# Patient Record
Sex: MALE | Race: WHITE | NOT HISPANIC OR LATINO | Employment: OTHER | URBAN - METROPOLITAN AREA
[De-identification: names, ages, dates, MRNs, and addresses within clinical notes are randomized per-mention and may not be internally consistent; named-entity substitution may affect disease eponyms.]

---

## 2017-08-07 PROBLEM — F17.200 SMOKING: Status: ACTIVE | Noted: 2017-08-07

## 2019-02-15 PROBLEM — R94.39 ABNORMAL STRESS TEST: Status: ACTIVE | Noted: 2019-02-15

## 2020-03-19 PROBLEM — I48.0 PAROXYSMAL ATRIAL FIBRILLATION: Status: ACTIVE | Noted: 2020-03-19

## 2021-04-29 ENCOUNTER — PATIENT MESSAGE (OUTPATIENT)
Dept: RESEARCH | Facility: HOSPITAL | Age: 61
End: 2021-04-29

## 2025-02-12 ENCOUNTER — TELEPHONE (OUTPATIENT)
Facility: CLINIC | Age: 65
End: 2025-02-12
Payer: MEDICARE

## 2025-02-12 DIAGNOSIS — C16.0 ADENOCARCINOMA OF CARDIO-ESOPHAGEAL JUNCTION: Primary | ICD-10-CM

## 2025-02-12 NOTE — NURSING
Reached out to patient in regards to message received from Dr. Mclean. Scheduled patient for PET scan. Patient and wife understand plan of care to have EUS procedure and awaiting pathology reports. They are unsure if they would like to be seen in [place]. He is establsihed at [facility] with Dr. Torres for iron transfusions. Contact information given. Will continue to monitor for results and follow up with patient accordingly.

## 2025-02-18 ENCOUNTER — HOSPITAL ENCOUNTER (OUTPATIENT)
Facility: HOSPITAL | Age: 65
Discharge: HOME | End: 2025-02-18
Attending: INTERNAL MEDICINE | Admitting: INTERNAL MEDICINE
Payer: MEDICARE

## 2025-02-18 ENCOUNTER — ANESTHESIA (OUTPATIENT)
Dept: SURGERY | Facility: HOSPITAL | Age: 65
End: 2025-02-18
Payer: MEDICARE

## 2025-02-18 ENCOUNTER — ANESTHESIA EVENT (OUTPATIENT)
Dept: SURGERY | Facility: HOSPITAL | Age: 65
End: 2025-02-18
Payer: MEDICARE

## 2025-02-18 VITALS
HEART RATE: 80 BPM | OXYGEN SATURATION: 98 % | RESPIRATION RATE: 3 BRPM | HEART RATE: 76 BPM | OXYGEN SATURATION: 97 % | SYSTOLIC BLOOD PRESSURE: 111 MMHG | DIASTOLIC BLOOD PRESSURE: 66 MMHG | DIASTOLIC BLOOD PRESSURE: 54 MMHG | SYSTOLIC BLOOD PRESSURE: 111 MMHG | RESPIRATION RATE: 16 BRPM | TEMPERATURE: 98 F

## 2025-02-18 DIAGNOSIS — C16.0 ADENOCARCINOMA OF CARDIO-ESOPHAGEAL JUNCTION: ICD-10-CM

## 2025-02-18 PROCEDURE — 37000009 HC ANESTHESIA EA ADD 15 MINS: Performed by: INTERNAL MEDICINE

## 2025-02-18 PROCEDURE — 37000008 HC ANESTHESIA 1ST 15 MINUTES: Performed by: INTERNAL MEDICINE

## 2025-02-18 PROCEDURE — 43259 EGD US EXAM DUODENUM/JEJUNUM: CPT | Performed by: INTERNAL MEDICINE

## 2025-02-18 PROCEDURE — 25000003 PHARM REV CODE 250: Performed by: NURSE ANESTHETIST, CERTIFIED REGISTERED

## 2025-02-18 PROCEDURE — 63600175 PHARM REV CODE 636 W HCPCS: Performed by: NURSE ANESTHETIST, CERTIFIED REGISTERED

## 2025-02-18 RX ORDER — PROPOFOL 10 MG/ML
VIAL (ML) INTRAVENOUS
Status: DISCONTINUED | OUTPATIENT
Start: 2025-02-18 | End: 2025-02-18

## 2025-02-18 RX ADMIN — PROPOFOL 100 MG: 10 INJECTION, EMULSION INTRAVENOUS at 12:02

## 2025-02-18 RX ADMIN — SODIUM CHLORIDE: 9 INJECTION, SOLUTION INTRAVENOUS at 12:02

## 2025-02-18 NOTE — TRANSFER OF CARE
Anesthesia Transfer of Care Note    Patient: Erick Mccloud II    Procedure(s) Performed: Procedure(s) (LRB):  ULTRASOUND, UPPER GI TRACT, ENDOSCOPIC (N/A)    Patient location: GI    Anesthesia Type: general    Transport from OR: Transported from OR on room air with adequate spontaneous ventilation    Post pain: adequate analgesia    Post assessment: no apparent anesthetic complications and tolerated procedure well    Post vital signs: stable    Level of consciousness: sedated and responds to stimulation    Nausea/Vomiting: no nausea/vomiting    Complications: none    Transfer of care protocol was followed      Last vitals: Visit Vitals  /80 (BP Location: Left arm, Patient Position: Lying)   Pulse 62   Temp 36.8 °C (98.2 °F) (Temporal)   Resp 17   SpO2 97%

## 2025-02-18 NOTE — H&P
GASTROENTEROLOGY PRE-PROCEDURE H&P NOTE  Patient Name: Erick Mccloud II  Patient MRN: [id number]  Patient : 1960    Service date: 2025    PCP: Eliana Burk MD    No chief complaint on file.      HPI: Patient is a 64 y.o. male with PMHx as below here for evaluation of staging of esophageal cancer. .     Past Medical History:  Past Medical History:   Diagnosis Date    Anticoagulant long-term use     Diabetes mellitus     Hypertension     Mixed hyperlipidemia     Sleep apnea     no CPAP        Past Surgical History:  Past Surgical History:   Procedure Laterality Date    ADENOIDECTOMY      ANGIOGRAM, CORONARY, WITH LEFT HEART CATHETERIZATION  1/15/2025    Procedure: Left Heart Cath;  Surgeon: Rajat Perera MD;  Location: [facility] CATH;  Service: Cardiology;;    ATHERECTOMY  2020    Procedure: Atherectomy;  Surgeon: Katarzyna Mccarty MD;  Location: [facility] CATH;  Service: Cardiology;;    BACK SURGERY      CARDIAC SURGERY      stents x 1 2017    CORONARY ANGIOGRAPHY Left 02/15/2019    Procedure: ANGIOGRAM, CORONARY ARTERY;  Surgeon: Katarzyna Mccarty MD;  Location: [facility] CATH;  Service: Cardiology;  Laterality: Left;    CORONARY ANGIOGRAPHY Left 2020    Procedure: ANGIOGRAM, CORONARY ARTERY;  Surgeon: Katarzyna Mccarty MD;  Location: [facility];  Service: Cardiology;  Laterality: Left;    CORONARY STENT PLACEMENT N/A 02/15/2019    Procedure: INSERTION, STENT, CORONARY ARTERY;  Surgeon: Katarzyna Mccarty MD;  Location: [facility];  Service: Cardiology;  Laterality: N/A;    LEFT HEART CATHETERIZATION Left 2020    Procedure: Left heart cath;  Surgeon: Katarzyna Mccarty MD;  Location: [facility]ATH;  Service: Cardiology;  Laterality: Left;    LUMBAR DISC SURGERY      TONSILLECTOMY          Home Medications:  Prescriptions Prior to Admission[1]            Review of patient's allergies indicates:  No Known Allergies    Social History:   Social History     Occupational History    Not on file   Tobacco Use    Smoking status:  "Every Day     Current packs/day: 1.00     Average packs/day: 1 pack/day for 46.1 years (46.1 ttl pk-yrs)     Types: Pipe, Cigarettes     Start date: 1979    Smokeless tobacco: Never    Tobacco comments:     Prescribed Chantix do not smoke 24 hours prior to procedure   Substance and Sexual Activity    Alcohol use: No    Drug use: No    Sexual activity: Yes     Partners: Female       Family History:   Family History   Problem Relation Name Age of Onset    Heart disease Mother         Review of Systems:  A 10 point review of systems was performed and was normal, except as mentioned in the HPI, including constitutional, HEENT, heme, lymph, cardiovascular, respiratory, gastrointestinal, genitourinary, neurologic, endocrine, psychiatric and musculoskeletal.      OBJECTIVE:    Physical Exam:  24 Hour Vital Sign Ranges: Temp:  [98.2 °F (36.8 °C)] 98.2 °F (36.8 °C)  Pulse:  [62] 62  Resp:  [17] 17  SpO2:  [97 %] 97 %  BP: (132)/(80) 132/80  Most recent vitals: /80 (BP Location: Left arm, Patient Position: Lying)   Pulse 62   Temp 98.2 °F (36.8 °C) (Temporal)   Resp 17   SpO2 97%    GEN: well-developed, well-nourished, awake and alert, non-toxic appearing adult  HEENT: PERRL, sclera anicteric, oral mucosa pink and moist without lesion  NECK: trachea midline; Good ROM  CV: regular rate and rhythm, no murmurs or gallops  RESP: clear to auscultation bilaterally, no wheezes, rhonci or rales  ABD: soft, non-tender, non-distended, normal bowel sounds  EXT: no swelling or edema, 2+ pulses distally  SKIN: no rashes or jaundice  PSYCH: normal affect    Labs:   No results for input(s): "WBC", "MCV", "PLT" in the last 72 hours.    Invalid input(s): "HGBAU"  No results for input(s): "NA", "K", "CL", "CO2", "BUN", "GLU" in the last 72 hours.    Invalid input(s): "CREA"  No results for input(s): "ALB" in the last 72 hours.    Invalid input(s): "ALKP", "SGOT", "SGPT", "TBIL", "DBIL", "TPRO"  No results for input(s): "PT", "INR", " ""PTT" in the last 72 hours.      IMPRESSION / RECOMMENDATIONS:  EGD/EUS  with interventions as warranted.   RIsks, benefits, alternatives discussed in detail regarding upcoming procedures and sedation. Some of the more common endoscopic complications include but not limited to immediate or delayed perforation, bleeding, infections, pain, inadvertent injury to surrounding tissue / organs and possible need for surgical evaluation. Patient expressed understanding, all questions answered and will proceed with procedure as planned.     Chepe Andrews III  2/18/2025  12:03 PM           [1]   Medications Prior to Admission   Medication Sig Dispense Refill Last Dose/Taking    bromocriptine (CYCLOSET) 0.8 mg Tab Take 2 tablets by mouth nightly.       JARDIANCE 10 mg tablet Take 10 mg by mouth once daily.       metoprolol succinate (TOPROL XL) 25 MG 24 hr tablet Take 1 tablet (25 mg total) by mouth once daily. (Patient taking differently: Take 25 mg by mouth nightly.) 90 tablet 3     multivitamin with minerals tablet Take 1 tablet by mouth every morning.        olmesartan (BENICAR) 40 MG tablet Take 40 mg by mouth once daily.       rosuvastatin (CRESTOR) 20 MG tablet TAKE 1 TABLET EVERY EVENING (Patient taking differently: Take 20 mg by mouth every evening.) 90 tablet 3     semaglutide (OZEMPIC SUBQ) Inject into the skin.       testosterone (ANDROGEL) 20.25 mg/1.25 gram (1.62 %) GlPm every 14 (fourteen) days. 200units injection once every 2 weeks       vitamins  A,C,E-zinc-copper [id number] unit-mg-unit Cap Take 1 tablet by mouth 2 (two) times a day.        "

## 2025-02-18 NOTE — ANESTHESIA PREPROCEDURE EVALUATION
02/18/2025  Erick Mccloud II is a 64 y.o., male.      Problem List[1]    Past Surgical History:   Procedure Laterality Date    ADENOIDECTOMY      ANGIOGRAM, CORONARY, WITH LEFT HEART CATHETERIZATION  1/15/2025    Procedure: Left Heart Cath;  Surgeon: Rajat Perera MD;  Location: [facility];  Service: Cardiology;;    ATHERECTOMY  02/07/2020    Procedure: Atherectomy;  Surgeon: Katarzyna Mccarty MD;  Location: [facility];  Service: Cardiology;;    BACK SURGERY      CARDIAC SURGERY      stents x 1 2017    CORONARY ANGIOGRAPHY Left 02/15/2019    Procedure: ANGIOGRAM, CORONARY ARTERY;  Surgeon: Katarzyna Mccarty MD;  Location: [facility];  Service: Cardiology;  Laterality: Left;    CORONARY ANGIOGRAPHY Left 02/07/2020    Procedure: ANGIOGRAM, CORONARY ARTERY;  Surgeon: Katarzyna Mccarty MD;  Location: [facility];  Service: Cardiology;  Laterality: Left;    CORONARY STENT PLACEMENT N/A 02/15/2019    Procedure: INSERTION, STENT, CORONARY ARTERY;  Surgeon: Katarzyna Mccarty MD;  Location: [facility];  Service: Cardiology;  Laterality: N/A;    LEFT HEART CATHETERIZATION Left 02/07/2020    Procedure: Left heart cath;  Surgeon: Katarzyna Mccarty MD;  Location: [facility];  Service: Cardiology;  Laterality: Left;    LUMBAR DISC SURGERY      TONSILLECTOMY          Tobacco Use:  The patient  reports that he has been smoking pipe and cigarettes. He started smoking about 46 years ago. He has a 46.1 pack-year smoking history. He has never used smokeless tobacco.     Results for orders placed or performed during the hospital encounter of 01/15/25   EKG 12-LEAD on arrival to floor    Collection Time: 01/15/25 12:05 PM   Result Value Ref Range    QRS Duration 110 ms    [facility] QTC Calculation 421 ms    Narrative    Test Reason : I25.10,    Vent. Rate :  74 BPM     Atrial Rate :  74 BPM     P-R Int : 124 ms          QRS Dur : 110 ms      QT Int : 380 ms        P-R-T Axes :  59  48  70 degrees    QTcB Int : 421 ms    Normal sinus rhythm  Incomplete right bundle branch block  Borderline Abnormal ECG  When compared with ECG of 15-Conrad-2025 08:31,  No significant change was found  Confirmed by Janusz Armando ([id number]) on 1/15/2025 10:15:12 PM    Referred By: Rajat Perera           Confirmed By: Janusz Armando             Lab Results   Component Value Date    WBC 8.85 01/15/2025    HGB 8.6 (L) 01/15/2025    HCT 28.3 (L) 01/15/2025    MCV 77 (L) 01/15/2025     (H) 01/15/2025     BMP  Lab Results   Component Value Date     01/15/2025    K 4.4 01/15/2025     01/15/2025    CO2 25 01/15/2025    BUN 15 01/15/2025    CREATININE 0.86 01/15/2025    CALCIUM 8.8 01/15/2025    ANIONGAP 6 (L) 01/15/2025     (H) 01/15/2025     (H) 11/14/2020     (H) 02/07/2020       Results for orders placed during the hospital encounter of 01/10/25    Echo Ultrasound enhancing contrast? Yes    Interpretation Summary    Left Ventricle: The left ventricle is normal in size. There is concentric remodeling. There is normal systolic function with a visually estimated ejection fraction of 60 - 65%. Grade I diastolic dysfunction.    Right Ventricle: Normal right ventricular cavity size. Systolic function is normal.    Left Atrium: Left atrium is severely dilated.    Aortic Valve: The aortic valve is a trileaflet valve. There is mild aortic valve sclerosis.    IVC/SVC: Normal venous pressure at 3 mmHg.    Pericardium: There is a trivial lateral effusion. No echocardiographic evidence of cardiac tamponade.      \    Pre-op Assessment    I have reviewed the Patient Summary Reports.     I have reviewed the Nursing Notes. I have reviewed the NPO Status.   I have reviewed the Medications.     Review of Systems  Anesthesia Hx:  No problems with previous Anesthesia             Denies Family Hx of Anesthesia complications.    Denies Personal Hx of Anesthesia complications.                     Social:  Smoker       Hematology/Oncology:  Hematology Normal   Oncology Normal                                   EENT/Dental:  EENT/Dental Normal           Cardiovascular:     Hypertension   CAD   CABG/stent           ECG has been reviewed.      Coronary Artery Disease:                            Hypertension     Atrial Fibrillation     Pulmonary:        Sleep Apnea     Obstructive Sleep Apnea (AYAD).           Renal/:  Renal/ Normal                 Hepatic/GI:  Hepatic/GI Normal                    Musculoskeletal:  Musculoskeletal Normal                Neurological:  Neurology Normal                                      Endocrine:  Diabetes    Diabetes                      Psych:  Psychiatric Normal                    Physical Exam  General: Well nourished, Cooperative, Oriented and Alert    Airway:  Mallampati: II   Mouth Opening: Normal  TM Distance: Normal  Tongue: Normal  Neck ROM: Normal ROM    Dental:  Intact, Periodontal disease    Chest/Lungs:  Clear to auscultation    Heart:  Rate: Normal  Rhythm: Regular Rhythm  Sounds: Normal        Anesthesia Plan  Type of Anesthesia, risks & benefits discussed:    Anesthesia Type: Gen Natural Airway  Intra-op Monitoring Plan: Standard ASA Monitors  Induction:  IV  Informed Consent: Informed consent signed with the Patient and all parties understand the risks and agree with anesthesia plan.  All questions answered.   ASA Score: 3    Ready For Surgery From Anesthesia Perspective.     .           [1]   Patient Active Problem List  Diagnosis    Coronary artery disease, Stent LCx 11/2016, prox-mid RCA 4.0 x 40 ORSIRO, 2/20    Essential hypertension    Dyslipidemia    Type 2 diabetes mellitus without complication, without long-term current use of insulin    Smoking    Abnormal stress test    Paroxysmal atrial fibrillation

## 2025-02-18 NOTE — [employer] PATIENT INSTRUCTIONS
Discharge Summary/Instructions after an Endoscopic Procedure  Patient Name: Erikc Mccloud  Patient MRN: [id number]  Patient YOB: 1960  Tuesday, February 18, 2025  Chepe Andrews III, MD  RESTRICTIONS:  During your procedure today, you received medications for sedation.  These   medications may affect your judgment, balance and coordination.  Therefore,   for 24 hours, you have the following restrictions:   - DO NOT drive a car, operate machinery, make legal/financial decisions,   sign important papers or drink alcohol.    ACTIVITY:  Today: no heavy lifting, straining or running due to procedural   sedation/anesthesia.  The following day: return to full activity including work.  DIET:  Eat and drink normally unless instructed otherwise.     TREATMENT FOR COMMON SIDE EFFECTS:  - Mild abdominal pain, nausea, belching, bloating or excessive gas:  rest,   eat lightly and use a heating pad.  - Sore Throat: treat with throat lozenges and/or gargle with warm salt   water.  - Because air was used during the procedure, expelling large amounts of air   from your rectum or belching is normal.  - If a bowel prep was taken, you may not have a bowel movement for 1-3 days.    This is normal.  SYMPTOMS TO WATCH FOR AND REPORT TO YOUR PHYSICIAN:  1. Abdominal pain or bloating, other than gas cramps.  2. Chest pain.  3. Back pain.  4. Signs of infection such as: chills or fever occurring within 24 hours   after the procedure.  5. Rectal bleeding, which would show as bright red, maroon, or black stools.   (A tablespoon of blood from the rectum is not serious, especially if   hemorrhoids are present.)  6. Vomiting.  7. Weakness or dizziness.  GO DIRECTLY TO THE NEAREST EMERGENCY ROOM IF YOU HAVE ANY OF THE FOLLOWING:      Difficulty breathing              Chills and/or fever over 101 F   Persistent vomiting and/or vomiting blood   Severe abdominal pain   Severe chest pain   Black, tarry stools   Bleeding- more than one  tablespoon   Any other symptom or condition that you feel may need urgent attention  Your doctor recommends these additional instructions:  If any biopsies were taken, your doctors clinic will contact you in 1 to 2   weeks with any results.  - Discharge patient to home.   - Patient has a contact number available for emergencies.  The signs and   symptoms of potential delayed complications were discussed with the   patient.  Return to normal activities tomorrow.  Written discharge   instructions were provided to the patient.   - Resume regular diet.   - Continue present medications.   - F/u PET/CT  coming up and f/u w/ oncology; Concerns based on endoscopic   and EUS appearance, that may not be a candidate fro surgery.  For questions, problems or results please call your physician - Chepe Andrews III, MD at Work:  [phone].  [facility], EMERGENCY ROOM PHONE NUMBER: [phone]  IF A COMPLICATION OR EMERGENCY SITUATION ARISES AND YOU ARE UNABLE TO REACH   YOUR PHYSICIAN - GO DIRECTLY TO THE EMERGENCY ROOM.  Chepe Andrews III, MD  2/18/2025 12:36:14 PM  [employer]

## 2025-02-19 NOTE — ANESTHESIA POSTPROCEDURE EVALUATION
Anesthesia Post Evaluation    Patient: Erick Mccloud II    Procedure(s) Performed: Procedure(s) (LRB):  ULTRASOUND, UPPER GI TRACT, ENDOSCOPIC (N/A)    Final Anesthesia Type: general      Patient location during evaluation: GI PACU  Patient participation: Yes- Able to Participate  Level of consciousness: awake and alert  Post-procedure vital signs: reviewed and stable  Pain management: adequate  Airway patency: patent    PONV status at discharge: No PONV  Anesthetic complications: no      Cardiovascular status: hemodynamically stable  Respiratory status: unassisted, spontaneous ventilation and room air  Hydration status: euvolemic  Follow-up not needed.              Vitals Value Taken Time   /54 02/18/25 12:35   Temp 36.4 °C (97.5 °F) 02/18/25 12:35   Pulse 88 02/18/25 13:02   Resp 16 02/18/25 12:35   SpO2 95 % 02/18/25 13:02   Vitals shown include unfiled device data.      Event Time   Out of Recovery 13:04:16         Pain/Gonzales Score: No data recorded

## 2025-02-25 ENCOUNTER — HOSPITAL ENCOUNTER (OUTPATIENT)
Dept: RADIOLOGY | Facility: HOSPITAL | Age: 65
Discharge: HOME | End: 2025-02-25
Payer: MEDICARE

## 2025-02-25 DIAGNOSIS — C16.0 ADENOCARCINOMA OF CARDIO-ESOPHAGEAL JUNCTION: ICD-10-CM

## 2025-02-25 LAB — GLUCOSE SERPL-MCNC: 128 MG/DL (ref 70–110)

## 2025-02-25 PROCEDURE — A9552 F18 FDG: HCPCS | Mod: PN | Performed by: STUDENT IN AN ORGANIZED HEALTH CARE EDUCATION/TRAINING PROGRAM

## 2025-02-25 PROCEDURE — 78815 PET IMAGE W/CT SKULL-THIGH: CPT | Mod: 26,PI,, | Performed by: RADIOLOGY

## 2025-02-25 PROCEDURE — 78815 PET IMAGE W/CT SKULL-THIGH: CPT | Mod: TC,PN

## 2025-02-25 RX ORDER — FLUDEOXYGLUCOSE F18 500 MCI/ML
12 INJECTION INTRAVENOUS
Status: COMPLETED | OUTPATIENT
Start: 2025-02-25 | End: 2025-02-25

## 2025-02-25 RX ADMIN — FLUDEOXYGLUCOSE F-18 11.3 MILLICURIE: 500 INJECTION INTRAVENOUS at 07:02

## 2025-04-10 PROBLEM — R53.83 FATIGUE: Status: ACTIVE | Noted: 2025-04-10

## 2025-05-27 DIAGNOSIS — Z01.89 ENCOUNTER FOR OTHER SPECIFIED SPECIAL EXAMINATIONS: Primary | ICD-10-CM

## 2025-05-28 PROBLEM — I95.9 HYPOTENSION: Status: ACTIVE | Noted: 2025-05-28

## 2025-05-28 PROBLEM — Z71.89 ACP (ADVANCE CARE PLANNING): Status: ACTIVE | Noted: 2025-05-28

## 2025-05-28 PROBLEM — D64.9 SYMPTOMATIC ANEMIA: Status: ACTIVE | Noted: 2025-05-28

## 2025-06-03 ENCOUNTER — HOSPITAL ENCOUNTER (OUTPATIENT)
Dept: RADIOLOGY | Facility: HOSPITAL | Age: 65
Discharge: HOME | End: 2025-06-03
Attending: FAMILY MEDICINE
Payer: MEDICARE

## 2025-06-03 DIAGNOSIS — Z01.89 ENCOUNTER FOR OTHER SPECIFIED SPECIAL EXAMINATIONS: ICD-10-CM

## 2025-06-03 PROCEDURE — 76775 US EXAM ABDO BACK WALL LIM: CPT | Mod: 26,,, | Performed by: RADIOLOGY

## 2025-06-03 PROCEDURE — 76775 US EXAM ABDO BACK WALL LIM: CPT | Mod: TC,PO
